# Patient Record
Sex: MALE | Race: WHITE | NOT HISPANIC OR LATINO | Employment: OTHER | URBAN - METROPOLITAN AREA
[De-identification: names, ages, dates, MRNs, and addresses within clinical notes are randomized per-mention and may not be internally consistent; named-entity substitution may affect disease eponyms.]

---

## 2019-07-24 ENCOUNTER — TRANSCRIBE ORDERS (OUTPATIENT)
Dept: ADMINISTRATIVE | Facility: HOSPITAL | Age: 64
End: 2019-07-24

## 2019-07-24 DIAGNOSIS — G62.9 POLYNEUROPATHY: Primary | ICD-10-CM

## 2019-08-21 ENCOUNTER — HOSPITAL ENCOUNTER (OUTPATIENT)
Dept: INFUSION THERAPY | Facility: HOSPITAL | Age: 64
Discharge: HOME | End: 2019-08-21
Admitting: PHYSICAL MEDICINE & REHABILITATION

## 2019-08-21 DIAGNOSIS — G62.9 POLYNEUROPATHY: ICD-10-CM

## 2019-08-21 PROCEDURE — 95910 NRV CNDJ TEST 7-8 STUDIES: CPT

## 2019-08-21 PROCEDURE — 95886 MUSC TEST DONE W/N TEST COMP: CPT

## 2019-08-21 NOTE — PROCEDURES
HISTORY:     The patient is a 64-year-old male who presents with a complaint of numbness and tingling of both upper and lower extremities.  He states that he has been experiencing the symptoms in the lower extremities for 3 years and in the upper extremities for 1 year.          I. NERVE CONDUCTION STUDIES:       The distal latency of the right median motor nerve is 4.2 ms .  The amplitude of evoked response is 3.3 mV (normal > 4.5 mV).  The conduction velocity is 50 m/sec.       The distal latency of the right ulnar motor nerve is 3.8 ms.  The amplitude of evoked response is 3.4 mV (normal > 4.3 mV).  The conduction velocity is 52 m/sec.       The distal latency of right median sensory nerve is 3.4 ms .  The amplitude of evoked response is 8 microvolts (normal > 20 microvolts).     The distal latency of right ulnar sensory nerve is 3.1 ms.  The amplitude of evoked response is 10 microvolts (normal > 15 microvolts).     The distal latency of the left median motor nerve is 4.3 ms .  The amplitude of evoked response is 4.3 mV.  The conduction velocity is 50 m/sec.        The distal latency of left median sensory nerve is 3.4 ms.  The amplitude of evoked response is 7 microvolts (normal > 20 microvolts).     The distal latency of the right peroneal motor nerve is 4.6 ms. The amplitude of evoked response is 258 uV (normal > 2.0 mV).  The conduction is 42 m/sec.     The distal latency of the right sural sensory nerve is 3.1 ms. The amplitude of evoked response is 7 microvolts (normal > 15 uV).       A consistent left peroneal motor evoked response could not be elicited.    The distal latency of the left sural sensory nerve is 3.3 ms. The amplitude of evoked response is 8 microvolts (normal > 15 uV).       II. ELECTROMYOGRAPHY:       Electromyography was performed on the following muscles of the left upper and lower extremity using a monopolar needle: Deltoid, biceps, brachioradialis, flexor carpi radialis, flexor carpi  ulnaris, abductor pollicis brevis, vastus lateralis, tibialis anterior, peroneus longus, medial and lateral gastrocnemius, and bicep femoris.       There were no changes in insertional activity. There were no denervation potentials present.       The motor unit action potentials were of normal height and duration. The recruitment patterns were normal.       III. IMPRESSION:      1. Nerve conduction studies show evidence of a sensorimotor neuropathy (axonal).       2. The electromyographic examination showed no abnormalities.          Gayle Tamayo M.D.*    8/21/2019  10:54 AM